# Patient Record
Sex: MALE | Race: WHITE
[De-identification: names, ages, dates, MRNs, and addresses within clinical notes are randomized per-mention and may not be internally consistent; named-entity substitution may affect disease eponyms.]

---

## 2022-12-07 ENCOUNTER — HOSPITAL ENCOUNTER (OUTPATIENT)
Dept: HOSPITAL 75 - RAD | Age: 72
End: 2022-12-07
Payer: MEDICARE
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
## 2022-12-07 DIAGNOSIS — M17.11: ICD-10-CM

## 2022-12-07 DIAGNOSIS — R60.0: ICD-10-CM

## 2022-12-07 DIAGNOSIS — S83.281A: Primary | ICD-10-CM

## 2022-12-07 PROCEDURE — 73721 MRI JNT OF LWR EXTRE W/O DYE: CPT

## 2022-12-07 NOTE — DIAGNOSTIC IMAGING REPORT
PROCEDURE: MRI right joint lower extremity without contrast.



TECHNIQUE: Multiplanar, multisequence non contrast-enhanced MRI

of the right lower extremity was accomplished.



INDICATION: Right knee pain.



COMPARISON: None.



FINDINGS:



There is marked bone marrow edema in the medial femoral condyle.

There does appear to be a linear hypointensity in the subcortical

medial femoral condyle which is suspicious for a nondisplaced

insufficiency fracture (image 7 series 3), but is not definitive.

No other fracture is seen in the right knee. Alignment is normal.

There are marginal osteophytes. There is a moderate right knee

joint effusion.



The articular cartilage in the patellofemoral compartment

demonstrates full-thickness loss throughout the median ridge and

lateral facet as well as the lateral trochlea with articular

surface remodeling. There is significant flattening of the

trochlea anteriorly. The articular cartilage in the medial

compartment demonstrates mild thinning with surface irregularity

and heterogeneity. The cartilage in the lateral compartment

demonstrates mild heterogeneity with no full-thickness defects.



The medial meniscus appears intact. The lateral meniscus has a

vertical tear at the anterior horn.



The anterior and posterior cruciate ligaments are intact. The

medial collateral ligament is intact. The lateral collateral

ligamentous complex appears intact. The extensor mechanism is

intact. The medial and lateral retinacula are intact. There is

generalized muscular atrophy. There is mild deep soft tissue

edema about the knee and lower leg.



IMPRESSION:

1. Bone marrow edema at the medial femoral condyle with

questionable nondisplaced insufficiency fracture.

2. Tricompartmental degenerative changes in the right knee,

severe in the patellofemoral compartment with marked articular

surface remodeling.

3. Tearing of the lateral meniscus.

4. Moderate right knee joint effusion.



Dictated by: 



  Dictated on workstation # [id number]

## 2022-12-17 ENCOUNTER — HOSPITAL ENCOUNTER (OUTPATIENT)
Dept: HOSPITAL 75 - LABNPT | Age: 72
End: 2022-12-17
Attending: INTERNAL MEDICINE
Payer: MEDICARE

## 2022-12-17 DIAGNOSIS — E11.22: ICD-10-CM

## 2022-12-17 DIAGNOSIS — I50.30: Primary | ICD-10-CM

## 2022-12-17 DIAGNOSIS — G25.0: ICD-10-CM

## 2022-12-17 DIAGNOSIS — R26.2: ICD-10-CM

## 2022-12-17 DIAGNOSIS — R53.1: ICD-10-CM

## 2022-12-17 DIAGNOSIS — R41.841: ICD-10-CM

## 2022-12-17 DIAGNOSIS — J18.9: ICD-10-CM

## 2022-12-17 LAB
APTT PPP: YELLOW S
BACTERIA #/AREA URNS HPF: (no result) /HPF
BILIRUB UR QL STRIP: NEGATIVE
FIBRINOGEN PPP-MCNC: (no result) MG/DL
GLUCOSE UR STRIP-MCNC: (no result) MG/DL
KETONES UR QL STRIP: NEGATIVE
LEUKOCYTE ESTERASE UR QL STRIP: (no result)
NITRITE UR QL STRIP: NEGATIVE
PH UR STRIP: 5.5 [PH] (ref 5–9)
PROT UR QL STRIP: (no result)
RBC #/AREA URNS HPF: (no result) /HPF
SP GR UR STRIP: 1.02 (ref 1.02–1.02)
SQUAMOUS #/AREA URNS HPF: (no result) /HPF
WBC #/AREA URNS HPF: (no result) /HPF

## 2022-12-17 PROCEDURE — 81000 URINALYSIS NONAUTO W/SCOPE: CPT

## 2022-12-17 PROCEDURE — 87088 URINE BACTERIA CULTURE: CPT

## 2022-12-17 PROCEDURE — 87186 SC STD MICRODIL/AGAR DIL: CPT

## 2022-12-17 PROCEDURE — 87077 CULTURE AEROBIC IDENTIFY: CPT
